# Patient Record
Sex: MALE | Race: WHITE | Employment: FULL TIME | URBAN - METROPOLITAN AREA
[De-identification: names, ages, dates, MRNs, and addresses within clinical notes are randomized per-mention and may not be internally consistent; named-entity substitution may affect disease eponyms.]

---

## 2021-09-07 DIAGNOSIS — M48.062 SPINAL STENOSIS, LUMBAR REGION, WITH NEUROGENIC CLAUDICATION: Primary | ICD-10-CM

## 2021-09-21 ENCOUNTER — OFFICE VISIT (OUTPATIENT)
Dept: PAIN CLINIC | Facility: HOSPITAL | Age: 56
End: 2021-09-21
Attending: PHYSICIAN ASSISTANT
Payer: COMMERCIAL

## 2021-09-21 VITALS
DIASTOLIC BLOOD PRESSURE: 84 MMHG | RESPIRATION RATE: 18 BRPM | WEIGHT: 238 LBS | HEART RATE: 65 BPM | HEIGHT: 68 IN | BODY MASS INDEX: 36.07 KG/M2 | SYSTOLIC BLOOD PRESSURE: 120 MMHG

## 2021-09-21 DIAGNOSIS — M48.062 SPINAL STENOSIS OF LUMBAR REGION WITH NEUROGENIC CLAUDICATION: Primary | ICD-10-CM

## 2021-09-21 PROCEDURE — 99202 OFFICE O/P NEW SF 15 MIN: CPT

## 2021-09-21 RX ORDER — CYCLOBENZAPRINE HCL 10 MG
TABLET ORAL
COMMUNITY
Start: 2021-08-09

## 2021-09-21 RX ORDER — GABAPENTIN 300 MG/1
300 CAPSULE ORAL 3 TIMES DAILY
COMMUNITY
Start: 2021-08-18

## 2021-09-21 RX ORDER — IBUPROFEN 600 MG/1
TABLET ORAL
COMMUNITY
Start: 2021-07-27

## 2021-09-21 NOTE — CHRONIC PAIN
Initial Consultation Note      HISTORY OF PRESENT ILLNESS:  Uzair Jones is a 60year old old male referred to the pain clinic  for evaluation treatment of his low back pain Mr. Vish Licea a very nice 78-year-old male who was referred to the pain clinic by Number of children: Not on file      Years of education: Not on file      Highest education level: Not on file    Occupational History      Not on file    Tobacco Use      Smoking status: Current Every Day Smoker        Start date: 8/21/2021      Smokeless 120/84   Pulse: 65   Resp: 18   Weight: 238 lb (108 kg)   Height: 5' 8\" (1.727 m)     General: Alert and oriented x3  Affect:  NAD  Head: normocephalic, atraumatic  Eyes: anicteric; no injection  Chest: S1, S2, RRR  Respiratory: CTAB  Gait: Normal; cane u plan was formulated. Conservative vs. Aggressive measures were discussed at length including pharmacotherapy (eg. Anti- inflammatories, muscle relaxants, neuropathic medications, oral steroids, analgesics), injections, and further testing.  Risks and benefi

## 2021-09-27 ENCOUNTER — SURGERY CENTER DOCUMENTATION (OUTPATIENT)
Dept: SURGERY | Age: 56
End: 2021-09-27

## 2021-09-27 NOTE — PROCEDURES
Mayur Salas.            Patient: Uzair Jones  MR #:[id number]  : 1965        Operative Report        Date of procedure: 2021    Preoperative diagnosis: Spinal stenosis with radiculopathy  Postop diagnosis:Spinal stenosis